# Patient Record
Sex: MALE | Race: ASIAN | URBAN - METROPOLITAN AREA
[De-identification: names, ages, dates, MRNs, and addresses within clinical notes are randomized per-mention and may not be internally consistent; named-entity substitution may affect disease eponyms.]

---

## 2017-11-08 ENCOUNTER — OFFICE VISIT (OUTPATIENT)
Dept: INTERNAL MEDICINE CLINIC | Age: 32
End: 2017-11-08

## 2017-11-08 VITALS
BODY MASS INDEX: 29.49 KG/M2 | OXYGEN SATURATION: 96 % | DIASTOLIC BLOOD PRESSURE: 74 MMHG | WEIGHT: 206 LBS | TEMPERATURE: 97.4 F | HEART RATE: 77 BPM | SYSTOLIC BLOOD PRESSURE: 117 MMHG | RESPIRATION RATE: 17 BRPM | HEIGHT: 70 IN

## 2017-11-08 DIAGNOSIS — I83.811 VARICOSE VEINS OF LOWER EXTREMITY WITH PAIN, RIGHT: Primary | ICD-10-CM

## 2017-11-08 DIAGNOSIS — H11.001 PTERYGIUM EYE, RIGHT: ICD-10-CM

## 2017-11-08 NOTE — PROGRESS NOTES
Subjective:     Chief Complaint   Patient presents with   [address]        He  is a 34y.o. year old male from [place] who presents as a new patient to establish as well as with some concerns. Reports that he has a varicose vein in his right leg which is getting bigger and painful in walking. He was  before. He also has c/o a spot in his corner of right eye  which is there for years. No change. Denies any blurry vision, pain. A comprehensive review of systems was negative except for that written in the HPI. Objective:     Vitals:    11/08/17 1036   BP: 117/74   Pulse: 77   Resp: 17   Temp: 97.4 °F (36.3 °C)   TempSrc: Oral   SpO2: 96%   Weight: 206 lb (93.4 kg)   Height: 5' 10.25\" (1.784 m)       Physical Examination: General appearance - alert, well appearing, and in no distress, oriented to person, place, and time and normal appearing weight  Mental status - alert, oriented to person, place, and time, normal mood, behavior, speech, dress, motor activity, and thought processes  Eyes - pupils equal and reactive, extraocular eye movements intact, there is a small pterygium in corner of the right eye. Chest - clear to auscultation, no wheezes, rales or rhonchi, symmetric air entry  Heart - normal rate, regular rhythm, normal S1, S2, no murmurs, rubs, clicks or gallops  Neurological - alert, oriented, normal speech, no focal findings or movement disorder noted  Extremities - no pedal edema noted, big varicose veins noted in medial and posterior side of the right lower thigh area.      No Known Allergies   Social History     Social History    Marital status:      Spouse name: N/A    Number of children: N/A    Years of education: N/A     Social History Main Topics    Smoking status: Never Smoker    Smokeless tobacco: Never Used    Alcohol use No    Drug use: No    Sexual activity: Not Asked     Other Topics Concern    None     Social History Narrative    None      No family history on file. History reviewed. No pertinent surgical history. History reviewed. No pertinent past medical history. Current Outpatient Prescriptions   Medication Sig Dispense Refill    OTHER daily. hydroxycut           Assessment/ Plan:   Diagnoses and all orders for this visit:    1. Varicose veins of lower extremity with pain, right  -     REFERRAL TO VASCULAR CENTER    2. Pterygium eye, right        - Small pterygium. Not effecting his vision. Will continue to monitor. Medication risks/benefits/costs/interactions/alternatives discussed with patient. Advised patient to call back or return to office if symptoms worsen/change/persist. If patient cannot reach us or should anything more severe/urgent arise he/she should proceed directly to the nearest emergency department. Discussed expected course/resolution/complications of diagnosis in detail with patient. Patient given a written after visit summary which includes her diagnoses, current medications and vitals. Patient expressed understanding with the diagnosis and plan. Follow-up Disposition:  Return in about 1 year (around 11/8/2018), or if symptoms worsen or fail to improve.

## 2017-11-08 NOTE — MR AVS SNAPSHOT
Visit Information Date & Time Provider Department Dept. Phone Encounter #  
 11/8/2017 10:30 AM Gallo Olivera MD [facility] Internal Medicine [phone] [id number] Follow-up Instructions Return in about 1 year (around 11/8/2018), or if symptoms worsen or fail to improve. Allergies as of 11/8/2017  Review Complete On: 11/8/2017 By: Melissa Churchill MD  
 No Known Allergies Current Immunizations  Reviewed on 11/8/2017 Name Date Influenza Vaccine 10/23/2017 Reviewed by Melissa Churchill MD on 11/8/2017 at 10:46 AM  
You Were Diagnosed With   
  
 Codes Comments Varicose veins of lower extremity with pain, right    -  Primary ICD-10-CM: N93.870 ICD-9-CM: 454.8 Pterygium eye, right     ICD-10-CM: H11.001 ICD-9-CM: 372.40 Vitals BP Pulse Temp Resp Height(growth percentile) Weight(growth percentile) 117/74 (BP 1 Location: Left arm, BP Patient Position: Sitting) 77 97.4 °F (36.3 °C) (Oral) 17 5' 10.25\" (1.784 m) 206 lb (93.4 kg) SpO2 BMI Smoking Status 96% 29.35 kg/m2 Never Smoker Vitals History BMI and BSA Data Body Mass Index Body Surface Area  
 29.35 kg/m 2 2.15 m 2 Preferred Pharmacy Pharmacy Name Phone [id number] [place], [address] [facility] [phone] Your Updated Medication List  
  
   
This list is accurate as of: 11/8/17 11:08 AM.  Always use your most recent med list.  
  
  
  
  
 OTHER  
daily. hydroxycut We Performed the Following REFERRAL TO VASCULAR CENTER [CIK087 Custom] Comments:  
 Please evaluate patient for varicose vein. Follow-up Instructions Return in about 1 year (around 11/8/2018), or if symptoms worsen or fail to improve. Referral Information Referral ID Referred By Referred To  
  
 [id number] GALLO PEDRO [facility] [address] [place] [address], [address] [place] Visits Status Start Date End Date 1 New Request 11/8/17 11/8/18 If your referral has a status of pending review or denied, additional information will be sent to support the outcome of this decision. Patient Instructions Pterygium and Pinguecula: Care Instructions Your Care Instructions Pterygium (say \"nic-SMA-lou-um\") and pinguecula (say \"yqlc-ZVSW-ofb-zachary\") are similar eye problems. They are both a growth on the conjunctiva. This is the lining of the eyelid and the covering of the white part of the eye. The growths may make your eyes dry and sore. · A pterygium grows on the cornea. This is the clear part of the eye that covers the colored part of the eye. It sometimes affects your vision. · A pinguecula may grow close to the cornea, but it does not grow over it. It is most common in older people. Too much exposure to the sun may play a role in these problems. They are most common in hot climates. Dry air, dust, and smoke can make them more likely to occur. Your doctor may prescribe steroid drops to reduce redness and irritation. You also may use over-the-counter drops (artificial tears) to keep your eyes wet. If a pterygium gets big enough to cover part of the cornea, you may need surgery to remove it. Follow-up care is a key part of your treatment and safety. Be sure to make and go to all appointments, and call your doctor if you are having problems. It's also a good idea to know your test results and keep a list of the medicines you take. How can you care for yourself at home? · Wear sunglasses and a hat when you are outdoors during the day. · Use over-the-counter artificial tears if your eyes feel dry. When should you call for help? Watch closely for changes in your health, and be sure to contact your doctor if: 
? · You have vision changes. ? · You do not get better as expected. Where can you learn more? Go to http://rafi-baltazar.info/. Enter Z139 in the search box to learn more about \"Pterygium and Pinguecula: Care Instructions. \" Current as of: March 3, 2017 Content Version: 11.4 © 4559-7819 Healthwise, Incorporated. Care instructions adapted under license by [employer] (which disclaims liability or warranty for this information). If you have questions about a medical condition or this instruction, always ask your healthcare professional. [place] [address] any warranty or liability for your use of this information. Introducing [facility] & HEALTH SERVICES! Talya Cantu introduces Kanari patient portal. Now you can access parts of your medical record, email your doctor's office, and request medication refills online. 1. In your internet browser, go to [email or web address] 2. Click on the First Time User? Click Here link in the Sign In box. You will see the New Member Sign Up page. 3. Enter your Kanari Access Code exactly as it appears below. You will not need to use this code after youve completed the sign-up process. If you do not sign up before the expiration date, you must request a new code. · Kanari Access Code: [facility] Expires: 2/6/2018 10:38 AM 
 
4. Enter the last four digits of your Social Security Number (xxxx) and Date of Birth (mm/dd/yyyy) as indicated and click Submit. You will be taken to the next sign-up page. 5. Create a Kanari ID. This will be your Kanari login ID and cannot be changed, so think of one that is secure and easy to remember. 6. Create a Kanari password. You can change your password at any time. 7. Enter your Password Reset Question and Answer. This can be used at a later time if you forget your password. 8. Enter your e-mail address. You will receive e-mail notification when new information is available in [address] 9. Click Sign Up. You can now view and download portions of your medical record. 10. Click the Download Summary menu link to download a portable copy of your medical information. If you have questions, please visit the Frequently Asked Questions section of the [employer] website. Remember, [employer] is NOT to be used for urgent needs. For medical emergencies, dial 911. Now available from your iPhone and Android! Please provide this summary of care documentation to your next provider. Your primary care clinician is listed as Gallo Squires. If you have any questions after today's visit, please call [phone].

## 2017-11-08 NOTE — PATIENT INSTRUCTIONS
Pterygium and Pinguecula: Care Instructions  Your Care Instructions    Pterygium (say \"wec-DBI-vwn-um\") and pinguecula (say \"jfqd-VTAF-lfy-zachary\") are similar eye problems. They are both a growth on the conjunctiva. This is the lining of the eyelid and the covering of the white part of the eye. The growths may make your eyes dry and sore. · A pterygium grows on the cornea. This is the clear part of the eye that covers the colored part of the eye. It sometimes affects your vision. · A pinguecula may grow close to the cornea, but it does not grow over it. It is most common in older people. Too much exposure to the sun may play a role in these problems. They are most common in hot climates. Dry air, dust, and smoke can make them more likely to occur. Your doctor may prescribe steroid drops to reduce redness and irritation. You also may use over-the-counter drops (artificial tears) to keep your eyes wet. If a pterygium gets big enough to cover part of the cornea, you may need surgery to remove it. Follow-up care is a key part of your treatment and safety. Be sure to make and go to all appointments, and call your doctor if you are having problems. It's also a good idea to know your test results and keep a list of the medicines you take. How can you care for yourself at home? · Wear sunglasses and a hat when you are outdoors during the day. · Use over-the-counter artificial tears if your eyes feel dry. When should you call for help? Watch closely for changes in your health, and be sure to contact your doctor if:  ? · You have vision changes. ? · You do not get better as expected. Where can you learn more? Go to http://rafi-baltazar.info/. Enter V053 in the search box to learn more about \"Pterygium and Pinguecula: Care Instructions. \"  Current as of: March 3, 2017  Content Version: 11.4  © 2197-4961 Healthwise, [employer]elks.  Care instructions adapted under license by [employer] (which disclaims liability or warranty for this information). If you have questions about a medical condition or this instruction, always ask your healthcare professional. [address] any warranty or liability for your use of this information.

## 2017-12-20 ENCOUNTER — TELEPHONE (OUTPATIENT)
Dept: INTERNAL MEDICINE CLINIC | Age: 32
End: 2017-12-20

## 2017-12-20 DIAGNOSIS — Z01.00 EYE EXAM, ROUTINE: ICD-10-CM

## 2017-12-20 DIAGNOSIS — H11.001 PTERYGIUM EYE, RIGHT: Primary | ICD-10-CM

## 2018-01-19 ENCOUNTER — OFFICE VISIT (OUTPATIENT)
Dept: INTERNAL MEDICINE CLINIC | Age: 33
End: 2018-01-19

## 2018-01-19 VITALS
TEMPERATURE: 98.2 F | HEART RATE: 85 BPM | BODY MASS INDEX: 29.78 KG/M2 | HEIGHT: 70 IN | RESPIRATION RATE: 18 BRPM | OXYGEN SATURATION: 96 % | WEIGHT: 208 LBS | SYSTOLIC BLOOD PRESSURE: 111 MMHG | DIASTOLIC BLOOD PRESSURE: 66 MMHG

## 2018-01-19 DIAGNOSIS — N52.9 ERECTILE DYSFUNCTION, UNSPECIFIED ERECTILE DYSFUNCTION TYPE: Primary | ICD-10-CM

## 2018-01-19 DIAGNOSIS — Z63.8 STRESS DUE TO FAMILY TENSION: ICD-10-CM

## 2018-01-19 RX ORDER — SILDENAFIL CITRATE 20 MG/1
TABLET ORAL
Qty: 30 TAB | Refills: 0 | Status: SHIPPED | OUTPATIENT
Start: 2018-01-19

## 2018-01-19 SDOH — SOCIAL STABILITY - SOCIAL INSECURITY: OTHER SPECIFIED PROBLEMS RELATED TO PRIMARY SUPPORT GROUP: Z63.8

## 2018-01-19 NOTE — MR AVS SNAPSHOT
Santana Ramos 
 
 
 [address] [place] [address] 
[phone] Patient: Darien Ngo MRN: [id number] FPX:[id number] Visit Information Date & Time Provider Department Dept. Phone Encounter #  
 1/19/2018  9:45 AM Gallo Camacho MD [facility] Internal Medicine [phone] [id number] Follow-up Instructions Return if symptoms worsen or fail to improve. Upcoming Health Maintenance Date Due DTaP/Tdap/Td series (1 - Tdap) 9/7/2005 Allergies as of 1/19/2018  Review Complete On: 1/19/2018 By: Katharine Guillermo MD  
 No Known Allergies Current Immunizations  Reviewed on 11/8/2017 Name Date Influenza Vaccine 10/23/2017 Not reviewed this visit You Were Diagnosed With   
  
 Codes Comments Erectile dysfunction, unspecified erectile dysfunction type    -  Primary ICD-10-CM: N52.9 ICD-9-CM: 607.84 Vitals BP Pulse Temp Resp Height(growth percentile) 111/66 (BP 1 Location: Left arm, BP Patient Position: Sitting) 85 98.2 °F (36.8 °C) (Temporal) 18 5' 10.15\" (1.782 m) Weight(growth percentile) SpO2 BMI Smoking Status 208 lb (94.3 kg) 96% 29.72 kg/m2 Never Smoker Vitals History BMI and BSA Data Body Mass Index Body Surface Area  
 29.72 kg/m 2 2.16 m 2 Preferred Pharmacy Pharmacy Name Phone [id number]- [place], [address] [facility] [phone] Your Updated Medication List  
  
   
This list is accurate as of: 1/19/18 10:51 AM.  Always use your most recent med list.  
  
  
  
  
 OTHER  
daily. hydroxycut  
  
 sildenafil (antihypertensive) 20 mg tablet Commonly known as:  REVATIO Take one tab 30 minutes prior to sex. Max dose 40 mg . Prescriptions Printed Refills  
 sildenafil, antihypertensive, (REVATIO) 20 mg tablet 0 Sig: Take one tab 30 minutes prior to sex. Max dose 40 mg .  
 Class: Print Follow-up Instructions Return if symptoms worsen or fail to improve. Patient Instructions Erectile Dysfunction: Care Instructions Your Care Instructions A man has erectile dysfunction (ED) when he routinely can't get or keep an erection that allows satisfactory sex. He may not be able to have an erection at any time. Or he may not be able to have one that is firm enough or lasts long enough to complete intercourse. ED is not the same as having trouble getting an erection now and then. That's common. It happens to most men at some time. ED can be caused by problems with the blood vessels, nerves, or hormones. It can be caused by diabetes, heart disease, and injuries. Nerve disorders, such as multiple sclerosis or Parkinson's disease, can also cause it. ED can also be caused by medicines, alcohol, and tobacco. Or it may be caused by depression, stress, grief, or relationship problems. Follow-up care is a key part of your treatment and safety. Be sure to make and go to all appointments, and call your doctor if you are having problems. It's also a good idea to know your test results and keep a list of the medicines you take. How can you care for yourself at home? ? Lifestyle ? · Limit alcohol. Have no more than 2 drinks a day. ? · Do not smoke. Smoking makes it harder for the blood vessels in the penis to relax and let blood flow in. If you need help quitting, talk to your doctor about stop-smoking programs and medicines. These can increase your chances of quitting for good. ? · Do not use cocaine, heroin, or other illegal drugs. ? · Try to reduce stress. ? · Give yourself time to adjust to change. Changes in your job, family, relationships, home life, and other areas can cause stress. And stress can cause erection problems. ?Work with your partner ?  · Don't assume that you know what your partner likes when it comes to sex. Anuradha Jackson may be wrong. Talk about what each of you does and does not enjoy. ? · Make time outside of the bedroom to talk about your sex life. If you avoid sex because you are afraid of having erection problems, your partner may worry that you are no longer interested. ? · If you and your partner have trouble talking about sex, see a therapist who can help you talk about it. Reading books with your partner about sexual health may also help. ? · Relax. Take time for more foreplay. Worrying about your erections may only make things worse. Medicines ? · Tell your doctor about all the medicines that you take. ¨ Some medicines can cause erection problems. ¨ Some medicines can have dangerous interactions with medicines that are prescribed for ED, including over-the-counter medicines and herbal products. ? · Be safe with medicines. Take your medicines exactly as prescribed. Call your doctor if you think you are having a problem with your medicine. ? · Talk to your doctor about trying a medicine to help you keep an erection. This could be a medicine such as Viagra, Levitra, or Cialis. If you have a heart problem, ask your doctor if these are safe for you. Do not take these medicines if you take nitroglycerin or other nitrate medicine. When should you call for help? Call your doctor now or seek immediate medical care if: 
? · You took a medicine for erectile dysfunction and you have an erection that lasts longer than 3 hours. ? Watch closely for changes in your health, and be sure to contact your doctor if you have any problems. Where can you learn more? Go to http://rafi-baltazar.info/. Enter [id number] in the search box to learn more about \"Erectile Dysfunction: Care Instructions. \" Current as of: March 14, 2017 Content Version: 11.4 © 6697-5604 [employer].  Care instructions adapted under license by [employer] (which disclaims liability or warranty for this information). If you have questions about a medical condition or this instruction, always ask your healthcare professional. [address] any warranty or liability for your use of this information. Introducing [facility] SERVICES! [facility] introduces Etsy patient portal. Now you can access parts of your medical record, email your doctor's office, and request medication refills online. 1. In your internet browser, go to [email or web address] 2. Click on the First Time User? Click Here link in the Sign In box. You will see the New Member Sign Up page. 3. Enter your Matthew Kenney [employer] Access Code exactly as it appears below. You will not need to use this code after youve completed the sign-up process. If you do not sign up before the expiration date, you must request a new code. · Etsy Access Code: [facility] Expires: 2/6/2018 10:38 AM 
 
4. Enter the last four digits of your Social Security Number (xxxx) and Date of Birth (mm/dd/yyyy) as indicated and click Submit. You will be taken to the next sign-up page. 5. Create a Etsy ID. This will be your Etsy login ID and cannot be changed, so think of one that is secure and easy to remember. 6. Create a Etsy password. You can change your password at any time. 7. Enter your Password Reset Question and Answer. This can be used at a later time if you forget your password. 8. Enter your e-mail address. You will receive e-mail notification when new information is available in [address] 9. Click Sign Up. You can now view and download portions of your medical record. 10. Click the Download Summary menu link to download a portable copy of your medical information. If you have questions, please visit the Frequently Asked Questions section of the [employer] website. Remember, [employer]y is NOT to be used for urgent needs. For medical emergencies, dial 911. Now available from your iPhone and Android! Please provide this summary of care documentation to your next provider. Your primary care clinician is listed as Gallo Squires. If you have any questions after today's visit, please call [phone].

## 2018-01-19 NOTE — PATIENT INSTRUCTIONS
Erectile Dysfunction: Care Instructions  Your Care Instructions    A man has erectile dysfunction (ED) when he routinely can't get or keep an erection that allows satisfactory sex. He may not be able to have an erection at any time. Or he may not be able to have one that is firm enough or lasts long enough to complete intercourse. ED is not the same as having trouble getting an erection now and then. That's common. It happens to most men at some time. ED can be caused by problems with the blood vessels, nerves, or hormones. It can be caused by diabetes, heart disease, and injuries. Nerve disorders, such as multiple sclerosis or Parkinson's disease, can also cause it. ED can also be caused by medicines, alcohol, and tobacco. Or it may be caused by depression, stress, grief, or relationship problems. Follow-up care is a key part of your treatment and safety. Be sure to make and go to all appointments, and call your doctor if you are having problems. It's also a good idea to know your test results and keep a list of the medicines you take. How can you care for yourself at home? ? Lifestyle  ? · Limit alcohol. Have no more than 2 drinks a day. ? · Do not smoke. Smoking makes it harder for the blood vessels in the penis to relax and let blood flow in. If you need help quitting, talk to your doctor about stop-smoking programs and medicines. These can increase your chances of quitting for good. ? · Do not use cocaine, heroin, or other illegal drugs. ? · Try to reduce stress. ? · Give yourself time to adjust to change. Changes in your job, family, relationships, home life, and other areas can cause stress. And stress can cause erection problems. ?Work with your partner  ? · Don't assume that you know what your partner likes when it comes to sex. You may be wrong. Talk about what each of you does and does not enjoy. ? · Make time outside of the bedroom to talk about your sex life.  If you avoid sex because you are afraid of having erection problems, your partner may worry that you are no longer interested. ? · If you and your partner have trouble talking about sex, see a therapist who can help you talk about it. Reading books with your partner about sexual health may also help. ? · Relax. Take time for more foreplay. Worrying about your erections may only make things worse. Medicines  ? · Tell your doctor about all the medicines that you take. ¨ Some medicines can cause erection problems. ¨ Some medicines can have dangerous interactions with medicines that are prescribed for ED, including over-the-counter medicines and herbal products. ? · Be safe with medicines. Take your medicines exactly as prescribed. Call your doctor if you think you are having a problem with your medicine. ? · Talk to your doctor about trying a medicine to help you keep an erection. This could be a medicine such as Viagra, Levitra, or Cialis. If you have a heart problem, ask your doctor if these are safe for you. Do not take these medicines if you take nitroglycerin or other nitrate medicine. When should you call for help? Call your doctor now or seek immediate medical care if:  ? · You took a medicine for erectile dysfunction and you have an erection that lasts longer than 3 hours. ? Watch closely for changes in your health, and be sure to contact your doctor if you have any problems. Where can you learn more? Go to http://rafi-baltazar.info/. Enter [id number] [phone] in the search box to learn more about \"Erectile Dysfunction: Care Instructions. \"  Current as of: March 14, 2017  Content Version: 11.4  © 7725-4192 [employer]. Care instructions adapted under license by [employer] (which disclaims liability or warranty for this information).  If you have questions about a medical condition or this instruction, always ask your healthcare professional. [place] [address] any warranty or liability for your use of this information.

## 2018-01-21 NOTE — PROGRESS NOTES
Subjective:     Chief Complaint   Patient presents with    Other     discuss issues with doctor     He  is a 34y.o. year old male with no significant past medical history  who presents today with a complain of erectile dysfunction. Reports he has been in lot of stress in his life which is effecting his sexual life. He and his wife has been trying to get pregnant which has been a which has been causing lot of stress as well. Reports that his libido is decrease. complaining of difficulty maintaining erection when he is able to have sex. Wondering if he can take Viagra /cialis for his symptoms. Denies any chest pain, cough, palpitation. Pertinent items are noted in HPI. Objective:     Vitals:    01/19/18 1026   BP: 111/66   Pulse: 85   Resp: 18   Temp: 98.2 °F (36.8 °C)   TempSrc: Temporal   SpO2: 96%   Weight: 208 lb (94.3 kg)   Height: 5' 10.15\" (1.782 m)       Physical Examination: General appearance - alert, well appearing, and in no distress, oriented to person, place, and time and overweight  Mental status - alert, oriented to person, place, and time, normal mood, behavior, speech, dress, motor activity, and thought processes  Chest - clear to auscultation, no wheezes, rales or rhonchi, symmetric air entry  Heart - normal rate, regular rhythm, normal S1, S2, no murmurs, rubs, clicks or gallops    No Known Allergies   Social History     Social History    Marital status:      Spouse name: N/A    Number of children: N/A    Years of education: N/A     Social History Main Topics    Smoking status: Never Smoker    Smokeless tobacco: Never Used    Alcohol use No    Drug use: No    Sexual activity: Not Asked     Other Topics Concern    None     Social History Narrative      No family history on file. History reviewed. No pertinent surgical history. History reviewed. No pertinent past medical history.    Current Outpatient Prescriptions   Medication Sig Dispense Refill    sildenafil, antihypertensive, (REVATIO) 20 mg tablet Take one tab 30 minutes prior to sex. Max dose 40 mg . 30 Tab 0    OTHER daily. hydroxycut           Assessment/ Plan:   Diagnoses and all orders for this visit:    1. Erectile dysfunction, unspecified erectile dysfunction type  -     sildenafil, antihypertensive, (REVATIO) 20 mg tablet; Take one tab 30 minutes prior to sex. Max dose 40 mg . The patient desires the above med to treat his erectile dysfunction. History and physical exam has not disclosed any obvious treatable cause of this complaint. He is informed that Viagra is sometimes not covered by insurance. It is available on a fee-for-service cost basis, and is relatively expensive. He can start with 20 mg dose, and increase to 40 mg if necessary. The method of use 1/2 hour prior to anticipated intercourse is explained. He should not use any more than one tablet in a 24 hour period. The side effects of possible headache, flushing, dyspepsia and transient changes in vision have been explained. The patient is not taking nitrates, and denies he has access to nitrates in any form at any time. I have counseled him that taking Viagra with nitrates of any form can cause death. Additionally, Viagra serum concentrations can be increased by the following: cimetidine, erythromycin, itraconazole or ketoconazole. This patient does not take these drugs, but I have counseled him to avoid Viagra if he does take any of these. We have also discussed the fact that there have been some deaths in patients after taking Viagra, felt due to the exertion of intercourse rather than the drug itself. The patient is aware of this, and accepts whatever unknown degree of risk there is in this aspect. 2. Stress due to family tension       - counseling provided. He will let me know if he need any further assistance/Rx. Medication risks/benefits/costs/interactions/alternatives discussed with patient.   Advised patient to call back or return to office if symptoms worsen/change/persist. If patient cannot reach us or should anything more severe/urgent arise he/she should proceed directly to the nearest emergency department. Discussed expected course/resolution/complications of diagnosis in detail with patient. Patient given a written after visit summary which includes her diagnoses, current medications and vitals. Patient expressed understanding with the diagnosis and plan. Follow-up Disposition:  Return if symptoms worsen or fail to improve.

## 2018-03-06 ENCOUNTER — OFFICE VISIT (OUTPATIENT)
Dept: INTERNAL MEDICINE CLINIC | Age: 33
End: 2018-03-06

## 2018-03-06 VITALS
TEMPERATURE: 98 F | OXYGEN SATURATION: 95 % | HEIGHT: 70 IN | HEART RATE: 108 BPM | BODY MASS INDEX: 29.35 KG/M2 | DIASTOLIC BLOOD PRESSURE: 65 MMHG | RESPIRATION RATE: 18 BRPM | SYSTOLIC BLOOD PRESSURE: 107 MMHG | WEIGHT: 205 LBS

## 2018-03-06 DIAGNOSIS — R21 RASH: Primary | ICD-10-CM

## 2018-03-06 RX ORDER — CEPHALEXIN 500 MG/1
500 CAPSULE ORAL 2 TIMES DAILY
Qty: 20 CAP | Refills: 0 | Status: SHIPPED | OUTPATIENT
Start: 2018-03-06 | End: 2018-03-16

## 2018-03-06 RX ORDER — CLINDAMYCIN HYDROCHLORIDE 300 MG/1
CAPSULE ORAL
Refills: 0 | COMMUNITY
Start: 2018-02-03

## 2018-03-06 RX ORDER — NAPROXEN 500 MG/1
TABLET ORAL
Refills: 0 | COMMUNITY
Start: 2018-02-03

## 2018-03-06 NOTE — MR AVS SNAPSHOT
[employer] [employer]. Devon Williamson [address] [place] [address] 
[phone] Patient: Rell Senior MRN: [id number] Visit Information Date & Time Provider Department Dept. Phone Encounter #  
 3/6/2018  4:00 PM Katharine Guillermo MD [facility] [phone] [id number] Follow-up Instructions Return if symptoms worsen or fail to improve. Upcoming Health Maintenance Date Due DTaP/Tdap/Td series (1 - Tdap) 9/7/2005 Allergies as of 3/6/2018  Review Complete On: 1/20/2018 By: Katharine Guillermo MD  
 No Known Allergies Current Immunizations  Reviewed on 11/8/2017 Name Date Influenza Vaccine 10/23/2017 Not reviewed this visit You Were Diagnosed With   
  
 Codes Comments Rash    -  Primary ICD-10-CM: R21 
ICD-9-CM: 782.1 Vitals BP Pulse Temp Resp Height(growth percentile) Weight(growth percentile) 107/65 (BP 1 Location: Left arm, BP Patient Position: Sitting) (!) 108 98 °F (36.7 °C) (Temporal) 18 5' 10.25\" (1.784 m) 205 lb (93 kg) SpO2 BMI Smoking Status 95% 29.21 kg/m2 Never Smoker Vitals History BMI and BSA Data Body Mass Index Body Surface Area  
 29.21 kg/m 2 2.15 m 2 Preferred Pharmacy Pharmacy Name Phone [id number], [address] [facility] [phone] Your Updated Medication List  
  
   
This list is accurate as of 3/6/18  4:21 PM.  Always use your most recent med list.  
  
  
  
  
 APPLE CIDER VINEGAR PO Take  by mouth. cephALEXin 500 mg capsule Commonly known as:  Verlena Anny Take 1 Cap by mouth two (2) times a day for 10 days. clindamycin 300 mg capsule Commonly known as:  CLEOCIN  
TAKE ONE CAPSULE BY MOUTH EVERY 6 HOURS  
  
 naproxen 500 mg tablet Commonly known as:  NAPROSYN  
TAKE 1 TABLET BY MOUTH TWICE A DAY  
  
 sildenafil (antihypertensive) 20 mg tablet Commonly known as:  REVATIO Take one tab 30 minutes prior to sex. Max dose 40 mg . Prescriptions Sent to Pharmacy Refills  
 cephALEXin (KEFLEX) 500 mg capsule 0 Sig: Take 1 Cap by mouth two (2) times a day for 10 days. Class: Normal  
 Pharmacy: [id number]- [place], [address] [place] [address] #: [phone] Route: Oral  
  
Follow-up Instructions Return if symptoms worsen or fail to improve. Introducing [facility]! [facility] introduces [employer] patient portal. Now you can access parts of your medical record, email your doctor's office, and request medication refills online. 1. In your internet browser, go to [email or web address] 2. Click on the First Time User? Click Here link in the Sign In box. You will see the New Member Sign Up page. 3. Enter your [employer] Access Code exactly as it appears below. You will not need to use this code after youve completed the sign-up process. If you do not sign up before the expiration date, you must request a new code. · [employer] Access Code: [email or web address] Expires: 6/4/2018  4:21 PM 
 
4. Enter the last four digits of your Social Security Number (xxxx) and Date of Birth (mm/dd/yyyy) as indicated and click Submit. You will be taken to the next sign-up page. 5. Create a [employer] ID. This will be your [employer] login ID and cannot be changed, so think of one that is secure and easy to remember. 6. Create a [employer] password. You can change your password at any time. 7. Enter your Password Reset Question and Answer. This can be used at a later time if you forget your password. 8. Enter your e-mail address. You will receive e-mail notification when new information is available in [address] 9. Click Sign Up. You can now view and download portions of your medical record. 10. Click the Download Summary menu link to download a portable copy of your medical information. If you have questions, please visit the Frequently Asked Questions section of the [employer] website. Remember, [employer] is NOT to be used for urgent needs. For medical emergencies, dial 911. Now available from your iPhone and Android! Please provide this summary of care documentation to your next provider. Your primary care clinician is listed as Gallo Squires. If you have any questions after today's visit, please call [phone].

## 2018-03-06 NOTE — PROGRESS NOTES
Subjective:     Chief Complaint   Patient presents with    Rash     overall body, itchy x over 1 wk        He  is a 34y.o. year old male who presents with a complain of itchy rash in his whole body started after using sauna . Reports that he was treated with Clindamycin for the same rash last month. Reports that he did not finish the whole course of the clindamycin because it was causing stomach discomfort. Last week he notice the same rash in his body after using the Sauna again. Its not painful but itchy. Pertinent items are noted in HPI. Objective:     Vitals:    03/06/18 1603   BP: 107/65   Pulse: (!) 108   Resp: 18   Temp: 98 °F (36.7 °C)   TempSrc: Temporal   SpO2: 95%   Weight: 205 lb (93 kg)   Height: 5' 10.25\" (1.784 m)       Physical Examination: General appearance - alert, well appearing, and in no distress, oriented to person, place, and time and overweight  Mental status - alert, oriented to person, place, and time, normal mood, behavior, speech, dress, motor activity, and thought processes  Skin - there area 2-3 mm flat, slight raised with white pin point area in the center rashes in his whole body including palm and bottom of the feet. Non ulcerated. No Known Allergies   Social History     Social History    Marital status:      Spouse name: N/A    Number of children: N/A    Years of education: N/A     Social History Main Topics    Smoking status: Never Smoker    Smokeless tobacco: Never Used    Alcohol use No    Drug use: No    Sexual activity: Not Asked     Other Topics Concern    None     Social History Narrative      No family history on file. History reviewed. No pertinent surgical history. History reviewed. No pertinent past medical history. Current Outpatient Prescriptions   Medication Sig Dispense Refill    APPLE CIDER VINEGAR PO Take  by mouth.  cephALEXin (KEFLEX) 500 mg capsule Take 1 Cap by mouth two (2) times a day for 10 days.  20 Cap 0    naproxen (NAPROSYN) 500 mg tablet TAKE 1 TABLET BY MOUTH TWICE A DAY  0    clindamycin (CLEOCIN) 300 mg capsule TAKE ONE CAPSULE BY MOUTH EVERY 6 HOURS  0    sildenafil, antihypertensive, (REVATIO) 20 mg tablet Take one tab 30 minutes prior to sex. Max dose 40 mg . 30 Tab 0        Assessment/ Plan:   Diagnoses and all orders for this visit:    1. Rash  -   Start   cephALEXin (KEFLEX) 500 mg capsule; Take 1 Cap by mouth two (2) times a day for 10 days. Medication risks/benefits/costs/interactions/alternatives discussed with patient. Advised patient to call back or return to office if symptoms worsen/change/persist. If patient cannot reach us or should anything more severe/urgent arise he/she should proceed directly to the nearest emergency department. Discussed expected course/resolution/complications of diagnosis in detail with patient. Patient given a written after visit summary which includes her diagnoses, current medications and vitals. Patient expressed understanding with the diagnosis and plan. Follow-up Disposition:  Return if symptoms worsen or fail to improve.

## 2018-06-12 ENCOUNTER — TELEPHONE (OUTPATIENT)
Dept: INTERNAL MEDICINE CLINIC | Age: 33
End: 2018-06-12

## 2018-06-12 RX ORDER — CEPHALEXIN 500 MG/1
500 CAPSULE ORAL 4 TIMES DAILY
Qty: 40 CAP | Refills: 0 | Status: SHIPPED | OUTPATIENT
Start: 2018-06-12 | End: 2018-06-22

## 2018-06-12 NOTE — TELEPHONE ENCOUNTER
Pt states that he noticed rash on skin again and would a refill of keflex. States that keflex helped to clear it up last time. Pt states that he does not have insurance at this time and is unable to afford to come in for an appt at this time.

## 2018-06-12 NOTE — TELEPHONE ENCOUNTER
Pl advise pt that keflex sent to pharmacy. If he continue to have symptoms then he need to have a follow up in office.